# Patient Record
Sex: FEMALE | Race: WHITE | NOT HISPANIC OR LATINO | Employment: FULL TIME | URBAN - METROPOLITAN AREA
[De-identification: names, ages, dates, MRNs, and addresses within clinical notes are randomized per-mention and may not be internally consistent; named-entity substitution may affect disease eponyms.]

---

## 2023-04-19 ENCOUNTER — APPOINTMENT (OUTPATIENT)
Dept: PRIMARY CARE | Facility: CLINIC | Age: 43
End: 2023-04-19

## 2024-02-04 ENCOUNTER — HOSPITAL ENCOUNTER (OUTPATIENT)
Dept: RADIOLOGY | Facility: EXTERNAL LOCATION | Age: 44
Discharge: HOME | End: 2024-02-04
Payer: COMMERCIAL

## 2024-02-04 DIAGNOSIS — M79.644 PAIN IN FINGER OF RIGHT HAND: ICD-10-CM

## 2024-03-28 ENCOUNTER — HOSPITAL ENCOUNTER (OUTPATIENT)
Dept: RADIOLOGY | Facility: HOSPITAL | Age: 44
Discharge: HOME | End: 2024-03-28
Payer: COMMERCIAL

## 2024-03-28 DIAGNOSIS — R92.8 OTHER ABNORMAL AND INCONCLUSIVE FINDINGS ON DIAGNOSTIC IMAGING OF BREAST: ICD-10-CM

## 2024-03-28 PROCEDURE — 77061 BREAST TOMOSYNTHESIS UNI: CPT | Mod: RT

## 2024-03-28 PROCEDURE — 77065 DX MAMMO INCL CAD UNI: CPT | Mod: RIGHT SIDE | Performed by: RADIOLOGY

## 2024-03-28 PROCEDURE — 76982 USE 1ST TARGET LESION: CPT | Mod: RT

## 2024-03-28 PROCEDURE — 76642 ULTRASOUND BREAST LIMITED: CPT | Mod: RIGHT SIDE | Performed by: RADIOLOGY

## 2024-03-28 PROCEDURE — 77061 BREAST TOMOSYNTHESIS UNI: CPT | Mod: RIGHT SIDE | Performed by: RADIOLOGY

## 2024-03-28 PROCEDURE — 76642 ULTRASOUND BREAST LIMITED: CPT | Mod: RT

## 2024-06-21 ENCOUNTER — APPOINTMENT (OUTPATIENT)
Dept: PRIMARY CARE | Facility: CLINIC | Age: 44
End: 2024-06-21
Payer: COMMERCIAL

## 2024-06-21 ENCOUNTER — OFFICE VISIT (OUTPATIENT)
Dept: PRIMARY CARE | Facility: CLINIC | Age: 44
End: 2024-06-21
Payer: COMMERCIAL

## 2024-06-21 VITALS
TEMPERATURE: 96.6 F | HEIGHT: 60 IN | BODY MASS INDEX: 30.63 KG/M2 | RESPIRATION RATE: 14 BRPM | WEIGHT: 156 LBS | OXYGEN SATURATION: 100 % | DIASTOLIC BLOOD PRESSURE: 67 MMHG | HEART RATE: 64 BPM | SYSTOLIC BLOOD PRESSURE: 99 MMHG

## 2024-06-21 DIAGNOSIS — Z11.59 NEED FOR HEPATITIS C SCREENING TEST: ICD-10-CM

## 2024-06-21 DIAGNOSIS — Z12.11 SCREENING FOR COLON CANCER: Primary | ICD-10-CM

## 2024-06-21 DIAGNOSIS — Z11.3 SCREENING FOR STD (SEXUALLY TRANSMITTED DISEASE): ICD-10-CM

## 2024-06-21 DIAGNOSIS — Z00.00 ANNUAL PHYSICAL EXAM: ICD-10-CM

## 2024-06-21 DIAGNOSIS — J45.20 MILD INTERMITTENT ASTHMA WITHOUT COMPLICATION (HHS-HCC): ICD-10-CM

## 2024-06-21 PROCEDURE — 99213 OFFICE O/P EST LOW 20 MIN: CPT

## 2024-06-21 PROCEDURE — 99396 PREV VISIT EST AGE 40-64: CPT

## 2024-06-21 PROCEDURE — 1036F TOBACCO NON-USER: CPT

## 2024-06-21 RX ORDER — FLUTICASONE PROPIONATE 110 UG/1
AEROSOL, METERED RESPIRATORY (INHALATION)
COMMUNITY
Start: 2022-12-13

## 2024-06-21 RX ORDER — AZELASTINE 1 MG/ML
SPRAY, METERED NASAL
COMMUNITY
Start: 2021-10-20

## 2024-06-21 RX ORDER — CETIRIZINE HYDROCHLORIDE 10 MG/1
10 TABLET ORAL
COMMUNITY

## 2024-06-21 RX ORDER — OMEPRAZOLE 40 MG/1
CAPSULE, DELAYED RELEASE ORAL
COMMUNITY
End: 2024-06-21 | Stop reason: ALTCHOICE

## 2024-06-21 RX ORDER — ALBUTEROL SULFATE 0.83 MG/ML
SOLUTION RESPIRATORY (INHALATION)
COMMUNITY

## 2024-06-21 RX ORDER — FAMOTIDINE 20 MG/1
TABLET, FILM COATED ORAL
COMMUNITY
Start: 2022-11-28

## 2024-06-21 SDOH — ECONOMIC STABILITY: FOOD INSECURITY: WITHIN THE PAST 12 MONTHS, YOU WORRIED THAT YOUR FOOD WOULD RUN OUT BEFORE YOU GOT MONEY TO BUY MORE.: NEVER TRUE

## 2024-06-21 SDOH — ECONOMIC STABILITY: FOOD INSECURITY: WITHIN THE PAST 12 MONTHS, THE FOOD YOU BOUGHT JUST DIDN'T LAST AND YOU DIDN'T HAVE MONEY TO GET MORE.: NEVER TRUE

## 2024-06-21 ASSESSMENT — ANXIETY QUESTIONNAIRES
7. FEELING AFRAID AS IF SOMETHING AWFUL MIGHT HAPPEN: SEVERAL DAYS
4. TROUBLE RELAXING: NEARLY EVERY DAY
GAD7 TOTAL SCORE: 10
1. FEELING NERVOUS, ANXIOUS, OR ON EDGE: SEVERAL DAYS
IF YOU CHECKED OFF ANY PROBLEMS ON THIS QUESTIONNAIRE, HOW DIFFICULT HAVE THESE PROBLEMS MADE IT FOR YOU TO DO YOUR WORK, TAKE CARE OF THINGS AT HOME, OR GET ALONG WITH OTHER PEOPLE: SOMEWHAT DIFFICULT
6. BECOMING EASILY ANNOYED OR IRRITABLE: SEVERAL DAYS
3. WORRYING TOO MUCH ABOUT DIFFERENT THINGS: NEARLY EVERY DAY
2. NOT BEING ABLE TO STOP OR CONTROL WORRYING: NOT AT ALL
5. BEING SO RESTLESS THAT IT IS HARD TO SIT STILL: SEVERAL DAYS

## 2024-06-21 ASSESSMENT — LIFESTYLE VARIABLES
AUDIT-C TOTAL SCORE: 0
HOW OFTEN DO YOU HAVE A DRINK CONTAINING ALCOHOL: NEVER
HOW OFTEN DO YOU HAVE SIX OR MORE DRINKS ON ONE OCCASION: NEVER
SKIP TO QUESTIONS 9-10: 1
HOW MANY STANDARD DRINKS CONTAINING ALCOHOL DO YOU HAVE ON A TYPICAL DAY: PATIENT DOES NOT DRINK

## 2024-06-21 ASSESSMENT — ENCOUNTER SYMPTOMS
GASTROINTESTINAL NEGATIVE: 1
CONSTITUTIONAL NEGATIVE: 1
PSYCHIATRIC NEGATIVE: 1
MUSCULOSKELETAL NEGATIVE: 1
HEMATOLOGIC/LYMPHATIC NEGATIVE: 1
NEUROLOGICAL NEGATIVE: 1
CARDIOVASCULAR NEGATIVE: 1
RESPIRATORY NEGATIVE: 1
EYES NEGATIVE: 1
ALLERGIC/IMMUNOLOGIC NEGATIVE: 1
ENDOCRINE NEGATIVE: 1

## 2024-06-21 ASSESSMENT — PATIENT HEALTH QUESTIONNAIRE - PHQ9
2. FEELING DOWN, DEPRESSED OR HOPELESS: NOT AT ALL
SUM OF ALL RESPONSES TO PHQ9 QUESTIONS 1 & 2: 0
1. LITTLE INTEREST OR PLEASURE IN DOING THINGS: NOT AT ALL

## 2024-06-21 ASSESSMENT — PAIN SCALES - GENERAL: PAINLEVEL: 0-NO PAIN

## 2024-06-21 NOTE — PATIENT INSTRUCTIONS
Thank you for coming to see me today.  If you have any questions or concerns following our visit, please contact the office.  Phone: [phone]    Follow up with me in 1 year    1)  Get fasting labwork completed.  The lab is down the [place] from our office, I will reach out with results

## 2024-06-21 NOTE — PROGRESS NOTES
Subjective   Patient ID: Ann Marie Montanze is a 44 y.o. female who presents for Annual Exam (Needs order for colonoscopy ).    JANKI Jesus presents for a yearly annual exam. She has not seen a PCP in a couple years. She would like a colonoscopy- she has contacted gen surg to set up an appointment already.  She eats healthy and walks when the weather permits.    Review of Systems   Constitutional: Negative.    HENT: Negative.     Eyes: Negative.    Respiratory: Negative.     Cardiovascular: Negative.    Gastrointestinal: Negative.    Endocrine: Negative.    Genitourinary: Negative.    Musculoskeletal: Negative.    Skin: Negative.    Allergic/Immunologic: Negative.    Neurological: Negative.    Hematological: Negative.    Psychiatric/Behavioral: Negative.           Objective   BP 99/67 (BP Location: Right arm, Patient Position: Sitting)   Pulse 64   Temp 35.9 °C (96.6 °F) (Temporal)   Resp 14   Ht 1.524 m (5')   Wt 70.8 kg (156 lb)   SpO2 100%   BMI 30.47 kg/m²     Physical Exam  Constitutional:       Appearance: Normal appearance.   HENT:      Right Ear: Tympanic membrane normal.      Left Ear: Tympanic membrane normal.   Cardiovascular:      Rate and Rhythm: Normal rate and regular rhythm.      Pulses: Normal pulses.      Heart sounds: Normal heart sounds.   Pulmonary:      Effort: Pulmonary effort is normal.      Breath sounds: Normal breath sounds.   Abdominal:      General: Bowel sounds are normal.   Musculoskeletal:         General: Normal range of motion.   Skin:     General: Skin is warm and dry.      Capillary Refill: Capillary refill takes less than 2 seconds.   Neurological:      Mental Status: She is oriented to person, place, and time.   Psychiatric:         Mood and Affect: Mood normal.         Behavior: Behavior normal.         Thought Content: Thought content normal.         Judgment: Judgment normal.         Assessment/Plan   Problem List Items Addressed This Visit             ICD-10-CM     Screening for colon cancer - Primary Z12.11    Relevant Orders    Colonoscopy Screening; Average Risk Patient    Need for hepatitis C screening test Z11.59    Relevant Orders    Hepatitis C Antibody    Mild intermittent asthma without complication ([facility]) J45.20     Other Visit Diagnoses         Codes    Screening for STD (sexually transmitted disease)     Z11.3    Relevant Orders    HIV 1/2 Antigen/Antibody Screen with Reflex to Confirmation    Annual physical exam     Z00.00    Relevant Orders    CBC    Comprehensive Metabolic Panel    Lipid Panel    TSH with reflex to Free T4 if abnormal

## 2024-06-24 ENCOUNTER — LAB (OUTPATIENT)
Dept: LAB | Facility: LAB | Age: 44
End: 2024-06-24
Payer: COMMERCIAL

## 2024-06-24 DIAGNOSIS — Z11.59 NEED FOR HEPATITIS C SCREENING TEST: ICD-10-CM

## 2024-06-24 DIAGNOSIS — Z00.00 ANNUAL PHYSICAL EXAM: ICD-10-CM

## 2024-06-24 DIAGNOSIS — Z11.3 SCREENING FOR STD (SEXUALLY TRANSMITTED DISEASE): ICD-10-CM

## 2024-06-24 LAB
ALBUMIN SERPL BCP-MCNC: 4.2 G/DL (ref 3.4–5)
ALP SERPL-CCNC: 36 U/L (ref 33–110)
ALT SERPL W P-5'-P-CCNC: 28 U/L (ref 7–45)
ANION GAP SERPL CALC-SCNC: 9 MMOL/L (ref 10–20)
AST SERPL W P-5'-P-CCNC: 73 U/L (ref 9–39)
BILIRUB SERPL-MCNC: 1 MG/DL (ref 0–1.2)
BUN SERPL-MCNC: 13 MG/DL (ref 6–23)
CALCIUM SERPL-MCNC: 8.6 MG/DL (ref 8.6–10.3)
CHLORIDE SERPL-SCNC: 105 MMOL/L (ref 98–107)
CHOLEST SERPL-MCNC: 142 MG/DL (ref 0–199)
CHOLESTEROL/HDL RATIO: 2.4
CO2 SERPL-SCNC: 26 MMOL/L (ref 21–32)
CREAT SERPL-MCNC: 0.6 MG/DL (ref 0.5–1.05)
EGFRCR SERPLBLD CKD-EPI 2021: >90 ML/MIN/1.73M*2
ERYTHROCYTE [DISTWIDTH] IN BLOOD BY AUTOMATED COUNT: 12.1 % (ref 11.5–14.5)
GLUCOSE SERPL-MCNC: 79 MG/DL (ref 74–99)
HCT VFR BLD AUTO: 38.5 % (ref 36–46)
HCV AB SER QL: NONREACTIVE
HDLC SERPL-MCNC: 58.3 MG/DL
HGB BLD-MCNC: 12.7 G/DL (ref 12–16)
HIV 1+2 AB+HIV1 P24 AG SERPL QL IA: NONREACTIVE
LDLC SERPL CALC-MCNC: 75 MG/DL
MCH RBC QN AUTO: 28.3 PG (ref 26–34)
MCHC RBC AUTO-ENTMCNC: 33 G/DL (ref 32–36)
MCV RBC AUTO: 86 FL (ref 80–100)
NON HDL CHOLESTEROL: 84 MG/DL (ref 0–149)
NRBC BLD-RTO: 0 /100 WBCS (ref 0–0)
PLATELET # BLD AUTO: 280 X10*3/UL (ref 150–450)
POTASSIUM SERPL-SCNC: 4.2 MMOL/L (ref 3.5–5.3)
PROT SERPL-MCNC: 6 G/DL (ref 6.4–8.2)
RBC # BLD AUTO: 4.49 X10*6/UL (ref 4–5.2)
SODIUM SERPL-SCNC: 136 MMOL/L (ref 136–145)
TRIGL SERPL-MCNC: 45 MG/DL (ref 0–149)
TSH SERPL-ACNC: 2.54 MIU/L (ref 0.44–3.98)
VLDL: 9 MG/DL (ref 0–40)
WBC # BLD AUTO: 3.6 X10*3/UL (ref 4.4–11.3)

## 2024-06-24 PROCEDURE — 85027 COMPLETE CBC AUTOMATED: CPT

## 2024-06-24 PROCEDURE — 36415 COLL VENOUS BLD VENIPUNCTURE: CPT

## 2024-06-24 PROCEDURE — 86803 HEPATITIS C AB TEST: CPT

## 2024-06-24 PROCEDURE — 84443 ASSAY THYROID STIM HORMONE: CPT

## 2024-06-24 PROCEDURE — 80061 LIPID PANEL: CPT

## 2024-06-24 PROCEDURE — 80053 COMPREHEN METABOLIC PANEL: CPT

## 2024-06-24 PROCEDURE — 87389 HIV-1 AG W/HIV-1&-2 AB AG IA: CPT

## 2024-07-08 ENCOUNTER — LAB (OUTPATIENT)
Dept: LAB | Facility: LAB | Age: 44
End: 2024-07-08
Payer: COMMERCIAL

## 2024-07-08 DIAGNOSIS — R89.9 ABNORMAL LABORATORY TEST RESULT: ICD-10-CM

## 2024-07-08 LAB
ALBUMIN SERPL BCP-MCNC: 4.2 G/DL (ref 3.4–5)
ALP SERPL-CCNC: 31 U/L (ref 33–110)
ALT SERPL W P-5'-P-CCNC: 8 U/L (ref 7–45)
ANION GAP SERPL CALC-SCNC: 11 MMOL/L (ref 10–20)
AST SERPL W P-5'-P-CCNC: 11 U/L (ref 9–39)
BILIRUB SERPL-MCNC: 0.7 MG/DL (ref 0–1.2)
BUN SERPL-MCNC: 13 MG/DL (ref 6–23)
CALCIUM SERPL-MCNC: 9.1 MG/DL (ref 8.6–10.3)
CHLORIDE SERPL-SCNC: 109 MMOL/L (ref 98–107)
CO2 SERPL-SCNC: 24 MMOL/L (ref 21–32)
CREAT SERPL-MCNC: 0.71 MG/DL (ref 0.5–1.05)
EGFRCR SERPLBLD CKD-EPI 2021: >90 ML/MIN/1.73M*2
ERYTHROCYTE [DISTWIDTH] IN BLOOD BY AUTOMATED COUNT: 12.6 % (ref 11.5–14.5)
GLUCOSE SERPL-MCNC: 90 MG/DL (ref 74–99)
HCT VFR BLD AUTO: 39.8 % (ref 36–46)
HGB BLD-MCNC: 13.1 G/DL (ref 12–16)
MCH RBC QN AUTO: 27.9 PG (ref 26–34)
MCHC RBC AUTO-ENTMCNC: 32.9 G/DL (ref 32–36)
MCV RBC AUTO: 85 FL (ref 80–100)
NRBC BLD-RTO: 0 /100 WBCS (ref 0–0)
PLATELET # BLD AUTO: 266 X10*3/UL (ref 150–450)
POTASSIUM SERPL-SCNC: 4.6 MMOL/L (ref 3.5–5.3)
PROT SERPL-MCNC: 6 G/DL (ref 6.4–8.2)
RBC # BLD AUTO: 4.69 X10*6/UL (ref 4–5.2)
SODIUM SERPL-SCNC: 139 MMOL/L (ref 136–145)
WBC # BLD AUTO: 4 X10*3/UL (ref 4.4–11.3)

## 2024-07-08 PROCEDURE — 36415 COLL VENOUS BLD VENIPUNCTURE: CPT

## 2024-07-08 PROCEDURE — 80053 COMPREHEN METABOLIC PANEL: CPT

## 2024-07-08 PROCEDURE — 85027 COMPLETE CBC AUTOMATED: CPT

## 2024-07-11 ENCOUNTER — TELEPHONE (OUTPATIENT)
Dept: PRIMARY CARE | Facility: CLINIC | Age: 44
End: 2024-07-11
Payer: COMMERCIAL

## 2024-07-11 ENCOUNTER — PATIENT MESSAGE (OUTPATIENT)
Dept: PRIMARY CARE | Facility: CLINIC | Age: 44
End: 2024-07-11
Payer: COMMERCIAL

## 2024-07-11 DIAGNOSIS — K21.9 GASTROESOPHAGEAL REFLUX DISEASE, UNSPECIFIED WHETHER ESOPHAGITIS PRESENT: Primary | ICD-10-CM

## 2024-07-11 RX ORDER — FAMOTIDINE 20 MG/1
20 TABLET, FILM COATED ORAL NIGHTLY
Qty: 30 TABLET | Refills: 5 | Status: SHIPPED | OUTPATIENT
Start: 2024-07-11 | End: 2025-01-07

## 2024-07-11 NOTE — TELEPHONE ENCOUNTER
Called patient and notified of message. Patient expressed verbal understanding and had no questions at this time. Pt will send this soon.

## 2024-07-11 NOTE — TELEPHONE ENCOUNTER
I can send something else in for her burping, but can she send me a picture of her tongue through a [employer] message?

## 2024-07-11 NOTE — TELEPHONE ENCOUNTER
Pt stated she was rx'd pantoprazole 40mg on 7/1 for acid reflux by a teledoc through her insurance. Pt has a red tip of her tongue that is still inflamed and the rest of her tongue is white. Pt is also burping throughout the day.  Pt states this is rx not helping. Pt is unable to get into anyone in the office today and leaves for vacation tomorrow. Pt states that she wanted to see what she should do. [facility] in [place].

## 2024-07-29 PROBLEM — J34.89 SINUS DRAINAGE: Status: ACTIVE | Noted: 2024-07-29

## 2024-07-29 PROBLEM — R49.0 CHRONIC HOARSENESS: Status: ACTIVE | Noted: 2022-12-21

## 2024-07-29 PROBLEM — E66.811 CLASS 1 OBESITY: Status: ACTIVE | Noted: 2024-07-29

## 2024-07-29 PROBLEM — E66.9 CLASS 1 OBESITY: Status: ACTIVE | Noted: 2024-07-29

## 2024-07-29 PROBLEM — J45.909 ASTHMA (HHS-HCC): Status: ACTIVE | Noted: 2024-07-29

## 2024-07-29 PROBLEM — J37.0 CHRONIC LARYNGITIS: Status: ACTIVE | Noted: 2022-12-21

## 2024-07-29 PROBLEM — Z85.820 HISTORY OF MALIGNANT MELANOMA: Status: ACTIVE | Noted: 2024-07-29

## 2024-07-30 ENCOUNTER — APPOINTMENT (OUTPATIENT)
Dept: PRIMARY CARE | Facility: CLINIC | Age: 44
End: 2024-07-30
Payer: COMMERCIAL

## 2024-07-30 VITALS
TEMPERATURE: 96.9 F | HEART RATE: 73 BPM | HEIGHT: 60 IN | DIASTOLIC BLOOD PRESSURE: 80 MMHG | BODY MASS INDEX: 29.45 KG/M2 | WEIGHT: 150 LBS | OXYGEN SATURATION: 99 % | RESPIRATION RATE: 20 BRPM | SYSTOLIC BLOOD PRESSURE: 118 MMHG

## 2024-07-30 DIAGNOSIS — K21.9 GASTROESOPHAGEAL REFLUX DISEASE, UNSPECIFIED WHETHER ESOPHAGITIS PRESENT: Primary | ICD-10-CM

## 2024-07-30 PROCEDURE — 3008F BODY MASS INDEX DOCD: CPT

## 2024-07-30 PROCEDURE — 1036F TOBACCO NON-USER: CPT

## 2024-07-30 PROCEDURE — 99213 OFFICE O/P EST LOW 20 MIN: CPT

## 2024-07-30 RX ORDER — OMEPRAZOLE 20 MG/1
20 CAPSULE, DELAYED RELEASE ORAL
Qty: 60 CAPSULE | Refills: 5 | Status: SHIPPED | OUTPATIENT
Start: 2024-07-30 | End: 2025-01-26

## 2024-07-30 RX ORDER — OMEPRAZOLE 40 MG/1
40 CAPSULE, DELAYED RELEASE ORAL
COMMUNITY
End: 2024-07-30

## 2024-07-30 SDOH — ECONOMIC STABILITY: FOOD INSECURITY: WITHIN THE PAST 12 MONTHS, YOU WORRIED THAT YOUR FOOD WOULD RUN OUT BEFORE YOU GOT MONEY TO BUY MORE.: NEVER TRUE

## 2024-07-30 SDOH — ECONOMIC STABILITY: FOOD INSECURITY: WITHIN THE PAST 12 MONTHS, THE FOOD YOU BOUGHT JUST DIDN'T LAST AND YOU DIDN'T HAVE MONEY TO GET MORE.: NEVER TRUE

## 2024-07-30 ASSESSMENT — ENCOUNTER SYMPTOMS
DEPRESSION: 0
NEUROLOGICAL NEGATIVE: 1
OCCASIONAL FEELINGS OF UNSTEADINESS: 0
ENDOCRINE NEGATIVE: 1
MUSCULOSKELETAL NEGATIVE: 1
PSYCHIATRIC NEGATIVE: 1
ALLERGIC/IMMUNOLOGIC NEGATIVE: 1
EYES NEGATIVE: 1
ROS GI COMMENTS: ACID REFLUX
RESPIRATORY NEGATIVE: 1
LOSS OF SENSATION IN FEET: 0
CARDIOVASCULAR NEGATIVE: 1
CONSTITUTIONAL NEGATIVE: 1
HEMATOLOGIC/LYMPHATIC NEGATIVE: 1

## 2024-07-30 ASSESSMENT — LIFESTYLE VARIABLES
AUDIT-C TOTAL SCORE: 0
HOW OFTEN DO YOU HAVE A DRINK CONTAINING ALCOHOL: NEVER
SKIP TO QUESTIONS 9-10: 1
HOW MANY STANDARD DRINKS CONTAINING ALCOHOL DO YOU HAVE ON A TYPICAL DAY: PATIENT DOES NOT DRINK
HOW OFTEN DO YOU HAVE SIX OR MORE DRINKS ON ONE OCCASION: NEVER

## 2024-07-30 ASSESSMENT — PATIENT HEALTH QUESTIONNAIRE - PHQ9
2. FEELING DOWN, DEPRESSED OR HOPELESS: NOT AT ALL
1. LITTLE INTEREST OR PLEASURE IN DOING THINGS: NOT AT ALL
SUM OF ALL RESPONSES TO PHQ9 QUESTIONS 1 & 2: 0

## 2024-07-30 ASSESSMENT — PAIN SCALES - GENERAL: PAINLEVEL: 0-NO PAIN

## 2024-07-30 NOTE — PROGRESS NOTES
Subjective   Patient ID: Ann Marie Montanez is a 44 y.o. female who presents for GERD (Acid reflux - tried taking pantoprazole didn't work.  Then was told to start taking omeprazole and Pepcid.  But is still having burning and burping.  Doesn't matter what is eaten.  -3 weeks.  //).    HPI   Ann Marie presents for a follow up on acid reflux. She endorses that she realized when her symptoms had started, she was told by ortho to start taking an aleve BID and she had eaten some acidic foods and then her acid reflux worsened. She does not get the typical burning sensation in her chest; she states that she can taste acid when she burps, or she wakes up and her throat is sore and tastes acidic. She is agreeable to meeting with GI to discuss an endoscopy  H pylori breath test ordered today.  Trying regimen of omeprazole 20 mg BID with famotidine during the day.    Review of Systems   Constitutional: Negative.    HENT: Negative.     Eyes: Negative.    Respiratory: Negative.     Cardiovascular: Negative.    Gastrointestinal:         Acid reflux   Endocrine: Negative.    Genitourinary: Negative.    Musculoskeletal: Negative.    Skin: Negative.    Allergic/Immunologic: Negative.    Neurological: Negative.    Hematological: Negative.    Psychiatric/Behavioral: Negative.         Objective   /80 (BP Location: Right arm, Patient Position: Sitting, BP Cuff Size: Adult)   Pulse 73   Temp 36.1 °C (96.9 °F)   Resp 20   Ht 1.524 m (5')   Wt 68 kg (150 lb)   SpO2 99%   BMI 29.29 kg/m²     Physical Exam  Cardiovascular:      Rate and Rhythm: Normal rate and regular rhythm.      Pulses: Normal pulses.      Heart sounds: Normal heart sounds.   Pulmonary:      Effort: Pulmonary effort is normal.      Breath sounds: Normal breath sounds.   Musculoskeletal:         General: Normal range of motion.   Skin:     General: Skin is warm and dry.      Capillary Refill: Capillary refill takes less than 2 seconds.   Neurological:      Mental  Status: She is oriented to person, place, and time.   Psychiatric:         Mood and Affect: Mood normal.         Behavior: Behavior normal.         Thought Content: Thought content normal.         Judgment: Judgment normal.         Assessment/Plan   Problem List Items Addressed This Visit             ICD-10-CM    GERD (gastroesophageal reflux disease) - Primary K21.9    Relevant Medications    omeprazole (PriLOSEC) 20 mg DR capsule    Other Relevant Orders    Referral to Gastroenterology    H. Pylori Breath Test

## 2024-07-30 NOTE — PATIENT INSTRUCTIONS
Thank you for coming to see me today.  If you have any questions or concerns following our visit, please contact the office.  Phone: [phone]    Follow up with me as scheduled    1)  Take omeprazole in the morning 30-60 minutes before eating and take it 60-90 minutes after dinner in the evening    2)

## 2024-07-31 ENCOUNTER — LAB (OUTPATIENT)
Dept: LAB | Facility: LAB | Age: 44
End: 2024-07-31
Payer: COMMERCIAL

## 2024-07-31 DIAGNOSIS — K21.9 GASTROESOPHAGEAL REFLUX DISEASE, UNSPECIFIED WHETHER ESOPHAGITIS PRESENT: ICD-10-CM

## 2024-07-31 PROCEDURE — 83013 H PYLORI (C-13) BREATH: CPT

## 2024-08-01 LAB — UREA BREATH TEST QL: NEGATIVE

## 2024-08-30 ENCOUNTER — TELEPHONE (OUTPATIENT)
Dept: GASTROENTEROLOGY | Facility: CLINIC | Age: 44
End: 2024-08-30
Payer: COMMERCIAL

## 2024-09-04 ENCOUNTER — TELEPHONE (OUTPATIENT)
Dept: GASTROENTEROLOGY | Facility: CLINIC | Age: 44
End: 2024-09-04
Payer: COMMERCIAL

## 2024-09-04 NOTE — TELEPHONE ENCOUNTER
----- Message from Cedrick WHITTEN sent at 9/4/2024  2:44 PM EDT -----  Regarding: RE: Schedule NP OV in Oct.  Called patient to schedule. She will call back if she decides to schedule here  ----- Message -----  From: Cedrick Coelho  Sent: 8/30/2024   2:04 PM EDT  To: [id number]  Subject: Schedule NP OV in Oct.                           Received faxed referral from Dr. Hu for GERD

## 2024-09-23 ENCOUNTER — HOSPITAL ENCOUNTER (OUTPATIENT)
Dept: GASTROENTEROLOGY | Facility: HOSPITAL | Age: 44
Discharge: HOME | End: 2024-09-23
Payer: COMMERCIAL

## 2024-09-23 ENCOUNTER — ANESTHESIA EVENT (OUTPATIENT)
Dept: GASTROENTEROLOGY | Facility: HOSPITAL | Age: 44
End: 2024-09-23
Payer: COMMERCIAL

## 2024-09-23 ENCOUNTER — ANESTHESIA (OUTPATIENT)
Dept: GASTROENTEROLOGY | Facility: HOSPITAL | Age: 44
End: 2024-09-23
Payer: COMMERCIAL

## 2024-09-23 VITALS
DIASTOLIC BLOOD PRESSURE: 69 MMHG | HEART RATE: 79 BPM | WEIGHT: 150 LBS | BODY MASS INDEX: 26.58 KG/M2 | SYSTOLIC BLOOD PRESSURE: 102 MMHG | RESPIRATION RATE: 20 BRPM | TEMPERATURE: 98.1 F | OXYGEN SATURATION: 100 % | HEIGHT: 63 IN

## 2024-09-23 DIAGNOSIS — Z12.11 SCREENING FOR COLON CANCER: ICD-10-CM

## 2024-09-23 DIAGNOSIS — Z80.0 FAMILY HISTORY OF COLON CANCER: ICD-10-CM

## 2024-09-23 PROCEDURE — 2500000004 HC RX 250 GENERAL PHARMACY W/ HCPCS (ALT 636 FOR OP/ED): Performed by: SURGERY

## 2024-09-23 PROCEDURE — 7100000009 HC PHASE TWO TIME - INITIAL BASE CHARGE

## 2024-09-23 PROCEDURE — 7100000010 HC PHASE TWO TIME - EACH INCREMENTAL 1 MINUTE

## 2024-09-23 PROCEDURE — 45378 DIAGNOSTIC COLONOSCOPY: CPT | Performed by: SURGERY

## 2024-09-23 PROCEDURE — 2500000004 HC RX 250 GENERAL PHARMACY W/ HCPCS (ALT 636 FOR OP/ED): Performed by: NURSE ANESTHETIST, CERTIFIED REGISTERED

## 2024-09-23 PROCEDURE — 3700000001 HC GENERAL ANESTHESIA TIME - INITIAL BASE CHARGE

## 2024-09-23 PROCEDURE — 2500000005 HC RX 250 GENERAL PHARMACY W/O HCPCS: Performed by: NURSE ANESTHETIST, CERTIFIED REGISTERED

## 2024-09-23 PROCEDURE — 3700000002 HC GENERAL ANESTHESIA TIME - EACH INCREMENTAL 1 MINUTE

## 2024-09-23 PROCEDURE — G0105 COLORECTAL SCRN; HI RISK IND: HCPCS | Performed by: SURGERY

## 2024-09-23 RX ORDER — PROPOFOL 10 MG/ML
INJECTION, EMULSION INTRAVENOUS AS NEEDED
Status: DISCONTINUED | OUTPATIENT
Start: 2024-09-23 | End: 2024-09-23

## 2024-09-23 RX ORDER — BISMUTH SUBSALICYLATE 262 MG
1 TABLET,CHEWABLE ORAL DAILY
COMMUNITY

## 2024-09-23 RX ORDER — SODIUM CHLORIDE 9 MG/ML
20 INJECTION, SOLUTION INTRAVENOUS CONTINUOUS
Status: DISCONTINUED | OUTPATIENT
Start: 2024-09-23 | End: 2024-09-24 | Stop reason: HOSPADM

## 2024-09-23 RX ORDER — LIDOCAINE HCL/PF 100 MG/5ML
SYRINGE (ML) INTRAVENOUS AS NEEDED
Status: DISCONTINUED | OUTPATIENT
Start: 2024-09-23 | End: 2024-09-23

## 2024-09-23 SDOH — HEALTH STABILITY: MENTAL HEALTH: CURRENT SMOKER: 0

## 2024-09-23 ASSESSMENT — PAIN SCALES - GENERAL
PAINLEVEL_OUTOF10: 0 - NO PAIN

## 2024-09-23 ASSESSMENT — COLUMBIA-SUICIDE SEVERITY RATING SCALE - C-SSRS
6. HAVE YOU EVER DONE ANYTHING, STARTED TO DO ANYTHING, OR PREPARED TO DO ANYTHING TO END YOUR LIFE?: NO
1. IN THE PAST MONTH, HAVE YOU WISHED YOU WERE DEAD OR WISHED YOU COULD GO TO SLEEP AND NOT WAKE UP?: NO
2. HAVE YOU ACTUALLY HAD ANY THOUGHTS OF KILLING YOURSELF?: NO

## 2024-09-23 ASSESSMENT — PAIN - FUNCTIONAL ASSESSMENT: PAIN_FUNCTIONAL_ASSESSMENT: 0-10

## 2024-09-23 NOTE — ANESTHESIA PREPROCEDURE EVALUATION
Patient: Ann Marie Montanez    Procedure Information       Date/Time: 09/23/24 1100    Scheduled providers: Delmar Mensah MD    Procedure: COLONOSCOPY    Location: [facility]            Relevant Problems   Pulmonary   (+) Asthma   (+) Mild intermittent asthma without complication ([facility]-HCC)      GI   (+) GERD (gastroesophageal reflux disease)      HEENT   (+) Sinus drainage       Clinical information reviewed:   Tobacco  Allergies  Meds   Med Hx  Surg Hx  OB Status  Fam Hx  Soc   Hx        NPO Detail:  NPO/Void Status  Date of Last Liquid: 09/23/24  Time of Last Liquid: 0200  Date of Last Solid: 09/21/24  Last Intake Type: Clear fluids         Physical Exam    Airway  Mallampati: II  TM distance: <3 FB     Cardiovascular - normal exam     Dental    Pulmonary - normal exam     Abdominal            Anesthesia Plan    History of general anesthesia?: no  History of complications of general anesthesia?: no    ASA 2     MAC     The patient is not a current smoker.    Anesthetic plan and risks discussed with patient.  Use of blood products discussed with who consented to blood products.

## 2024-09-23 NOTE — H&P
"History Of Present Illness  Ann Marie Montanez is a 44 y.o. female presenting with screen, occ brbpr.     Past Medical History  She has a past medical history of GERD (gastroesophageal reflux disease) and Personal history of malignant melanoma of skin.    Surgical History  She has a past surgical history that includes Other surgical history (09/16/2021).     Social History  She reports that she has never smoked. She has never been exposed to tobacco smoke. She has never used smokeless tobacco. She reports current alcohol use. She reports that she does not use drugs.    Family History  Family History   Problem Relation Name Age of Onset    Colon cancer Maternal Grandmother          Allergies  Cefaclor and Pollen extracts    Review of Systems   All other systems reviewed and are negative.       Physical Exam  Vitals reviewed.   Cardiovascular:      Rate and Rhythm: Normal rate and regular rhythm.   Pulmonary:      Breath sounds: Normal breath sounds.   Skin:     General: Skin is warm and dry.   Neurological:      Mental Status: She is alert.   Psychiatric:         Mood and Affect: Mood normal.          Last Recorded Vitals  Blood pressure 116/82, pulse 85, temperature 36.3 °C (97.4 °F), temperature source Temporal, resp. rate 16, height 1.6 m (5' 3\"), weight 68 kg (150 lb), SpO2 100%.    Relevant Results               Assessment/Plan   Assessment & Plan  Screening for colon cancer    Family history of colon cancer      For colo       I spent 15 minutes in the professional and overall care of this patient.      Delmar Mensah MD  "

## 2024-09-23 NOTE — ANESTHESIA POSTPROCEDURE EVALUATION
Patient: Ann Marie Montanez    Procedure Summary       Date: 09/23/24 Room / Location: [facility]    Anesthesia Start: 1040 Anesthesia Stop: 1120    Procedure: COLONOSCOPY Diagnosis:       Screening for colon cancer      Family history of colon cancer    Scheduled Providers: Delmar Mensah MD Responsible Provider: TYLER Riggins    Anesthesia Type: MAC ASA Status: 2            Anesthesia Type: MAC    Vitals Value Taken Time   BP 94/60 09/23/24 1116   Temp 36.4 °C (97.5 °F) 09/23/24 1116   Pulse 81 09/23/24 1116   Resp 16 09/23/24 1116   SpO2 100 % 09/23/24 1116       Anesthesia Post Evaluation    Patient location during evaluation: bedside  Patient participation: complete - patient participated  Level of consciousness: awake  Pain management: adequate  Airway patency: patent  Cardiovascular status: acceptable  Respiratory status: acceptable  Hydration status: acceptable  Postoperative Nausea and Vomiting: none    There were no known notable events for this encounter.

## 2024-09-24 ENCOUNTER — HOSPITAL ENCOUNTER (OUTPATIENT)
Dept: RADIOLOGY | Facility: CLINIC | Age: 44
Discharge: HOME | End: 2024-09-24
Payer: COMMERCIAL

## 2024-09-24 ENCOUNTER — APPOINTMENT (OUTPATIENT)
Dept: PRIMARY CARE | Facility: CLINIC | Age: 44
End: 2024-09-24
Payer: COMMERCIAL

## 2024-09-24 VITALS
DIASTOLIC BLOOD PRESSURE: 70 MMHG | WEIGHT: 151.8 LBS | HEART RATE: 73 BPM | RESPIRATION RATE: 20 BRPM | HEIGHT: 63 IN | TEMPERATURE: 96.9 F | SYSTOLIC BLOOD PRESSURE: 118 MMHG | OXYGEN SATURATION: 99 % | BODY MASS INDEX: 26.9 KG/M2

## 2024-09-24 VITALS — HEIGHT: 63 IN | BODY MASS INDEX: 26.58 KG/M2 | WEIGHT: 150 LBS

## 2024-09-24 DIAGNOSIS — R21 RASH OF BOTH HANDS: Primary | ICD-10-CM

## 2024-09-24 DIAGNOSIS — Z12.31 SCREENING MAMMOGRAM FOR BREAST CANCER: ICD-10-CM

## 2024-09-24 DIAGNOSIS — J45.20 MILD INTERMITTENT ASTHMA WITHOUT COMPLICATION (HHS-HCC): Primary | ICD-10-CM

## 2024-09-24 DIAGNOSIS — J30.9 ALLERGIC RHINITIS, UNSPECIFIED SEASONALITY, UNSPECIFIED TRIGGER: ICD-10-CM

## 2024-09-24 PROCEDURE — 77063 BREAST TOMOSYNTHESIS BI: CPT | Performed by: RADIOLOGY

## 2024-09-24 PROCEDURE — 77067 SCR MAMMO BI INCL CAD: CPT

## 2024-09-24 PROCEDURE — 3008F BODY MASS INDEX DOCD: CPT

## 2024-09-24 PROCEDURE — 77067 SCR MAMMO BI INCL CAD: CPT | Performed by: RADIOLOGY

## 2024-09-24 PROCEDURE — 1036F TOBACCO NON-USER: CPT

## 2024-09-24 PROCEDURE — 99213 OFFICE O/P EST LOW 20 MIN: CPT

## 2024-09-24 RX ORDER — OMEPRAZOLE 40 MG/1
1 CAPSULE, DELAYED RELEASE ORAL
COMMUNITY
Start: 2024-09-03

## 2024-09-24 RX ORDER — ALBUTEROL SULFATE 90 UG/1
2 INHALANT RESPIRATORY (INHALATION) EVERY 4 HOURS PRN
Qty: 8.5 G | Refills: 3 | Status: SHIPPED | OUTPATIENT
Start: 2024-09-24 | End: 2025-09-24

## 2024-09-24 RX ORDER — ALBUTEROL SULFATE 0.83 MG/ML
2.5 SOLUTION RESPIRATORY (INHALATION) EVERY 4 HOURS PRN
Qty: 75 ML | Refills: 1 | Status: SHIPPED | OUTPATIENT
Start: 2024-09-24 | End: 2024-09-24 | Stop reason: WASHOUT

## 2024-09-24 RX ORDER — FLUTICASONE PROPIONATE 110 UG/1
1 AEROSOL, METERED RESPIRATORY (INHALATION)
Qty: 12 G | Refills: 11 | Status: SHIPPED | OUTPATIENT
Start: 2024-09-24 | End: 2024-09-24 | Stop reason: WASHOUT

## 2024-09-24 SDOH — ECONOMIC STABILITY: FOOD INSECURITY: WITHIN THE PAST 12 MONTHS, THE FOOD YOU BOUGHT JUST DIDN'T LAST AND YOU DIDN'T HAVE MONEY TO GET MORE.: NEVER TRUE

## 2024-09-24 SDOH — ECONOMIC STABILITY: FOOD INSECURITY: WITHIN THE PAST 12 MONTHS, YOU WORRIED THAT YOUR FOOD WOULD RUN OUT BEFORE YOU GOT MONEY TO BUY MORE.: NEVER TRUE

## 2024-09-24 ASSESSMENT — PATIENT HEALTH QUESTIONNAIRE - PHQ9
SUM OF ALL RESPONSES TO PHQ9 QUESTIONS 1 & 2: 0
1. LITTLE INTEREST OR PLEASURE IN DOING THINGS: NOT AT ALL
2. FEELING DOWN, DEPRESSED OR HOPELESS: NOT AT ALL

## 2024-09-24 ASSESSMENT — LIFESTYLE VARIABLES
HOW OFTEN DO YOU HAVE SIX OR MORE DRINKS ON ONE OCCASION: NEVER
HOW MANY STANDARD DRINKS CONTAINING ALCOHOL DO YOU HAVE ON A TYPICAL DAY: PATIENT DOES NOT DRINK
SKIP TO QUESTIONS 9-10: 1
AUDIT-C TOTAL SCORE: 0
HOW OFTEN DO YOU HAVE A DRINK CONTAINING ALCOHOL: NEVER

## 2024-09-24 ASSESSMENT — ENCOUNTER SYMPTOMS
DEPRESSION: 0
MUSCULOSKELETAL NEGATIVE: 1
LOSS OF SENSATION IN FEET: 0
EYES NEGATIVE: 1
OCCASIONAL FEELINGS OF UNSTEADINESS: 0
CARDIOVASCULAR NEGATIVE: 1
RESPIRATORY NEGATIVE: 1
CONSTITUTIONAL NEGATIVE: 1
GASTROINTESTINAL NEGATIVE: 1
HEMATOLOGIC/LYMPHATIC NEGATIVE: 1

## 2024-09-24 ASSESSMENT — PAIN SCALES - GENERAL: PAINLEVEL: 0-NO PAIN

## 2024-09-24 NOTE — PATIENT INSTRUCTIONS
Thank you for coming to see me today.  If you have any questions or concerns following our visit, please contact the office.  Phone: [phone]    Follow up with me as scheduled

## 2024-09-24 NOTE — ASSESSMENT & PLAN NOTE
Has improved since last visit- is not present anywhere else on body. Throat is erythematous and she is also experiencing rhinorrhea with postnasal drip. Denies fevers, chills, body aches

## 2024-09-24 NOTE — PROGRESS NOTES
"Subjective   Patient ID: Ann Marie Montanez is a 44 y.o. female who presents for wart (Bilateral hands right hand 1st digit left 3rd digit.).    HPI   Ann Marie presents with concerns for warts on her bilateral hands. She endorses that there have been a couple red spots that have shown up, but have disappeared since. She also has a calloused area on her right thumb that she states has improved since she called to make the appointment. 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 Suspect this may be viral rather than warts, since the areas have improved. Education provided to call office for follow up if these areas worsen.   She also has been having increased sinus drainage and states this happens with a change in weather. Flovent inhaler and albuterol nebulizer solution sent to pharmacy.     Review of Systems   Constitutional: Negative.    HENT:  Positive for postnasal drip.    Eyes: Negative.    Respiratory: Negative.     Cardiovascular: Negative.    Gastrointestinal: Negative.    Genitourinary: Negative.    Musculoskeletal: Negative.    Skin:  Positive for rash.   Hematological: Negative.        Objective   /70 (BP Location: Right arm, Patient Position: Sitting, BP Cuff Size: Adult)   Pulse 73   Temp 36.1 °C (96.9 °F)   Resp 20   Ht 1.6 m (5' 3\")   Wt 68.9 kg (151 lb 12.8 oz)   SpO2 99%   BMI 26.89 kg/m²     Physical Exam  HENT:      Right Ear: Tympanic membrane normal.      Left Ear: Tympanic membrane normal.      Nose: Congestion and rhinorrhea present.      Mouth/Throat:      Pharynx: Posterior oropharyngeal erythema present.   Skin:     General: Skin is warm and dry.      Findings: Rash present. Rash is papular.      Comments: Sporadically on hands- 4-5 spots noted that have improved per patient         Assessment/Plan   Problem List Items Addressed This Visit             ICD-10-CM    Allergic rhinitis J30.9    " Relevant Medications    albuterol 2.5 mg /3 mL (0.083 %) nebulizer solution    fluticasone (Flovent HFA) 110 mcg/actuation inhaler    Rash of both hands - Primary R21     Has improved since last visit- is not present anywhere else on body. Throat is erythematous and she is also experiencing rhinorrhea with postnasal drip. Denies fevers, chills, body aches

## 2024-10-08 DIAGNOSIS — R92.8 ABNORMAL MAMMOGRAM OF LEFT BREAST: Primary | ICD-10-CM

## 2024-10-24 ENCOUNTER — HOSPITAL ENCOUNTER (OUTPATIENT)
Dept: RADIOLOGY | Facility: HOSPITAL | Age: 44
Discharge: HOME | End: 2024-10-24
Payer: COMMERCIAL

## 2024-10-24 DIAGNOSIS — R92.8 ABNORMAL MAMMOGRAM OF LEFT BREAST: ICD-10-CM

## 2024-10-24 PROCEDURE — 77065 DX MAMMO INCL CAD UNI: CPT | Mod: LT

## 2024-10-24 PROCEDURE — 77065 DX MAMMO INCL CAD UNI: CPT | Mod: LEFT SIDE | Performed by: RADIOLOGY

## 2024-10-25 DIAGNOSIS — R92.8 ABNORMAL MAMMOGRAM OF LEFT BREAST: Primary | ICD-10-CM

## 2024-10-31 ENCOUNTER — APPOINTMENT (OUTPATIENT)
Dept: GASTROENTEROLOGY | Facility: CLINIC | Age: 44
End: 2024-10-31
Payer: COMMERCIAL

## 2024-10-31 VITALS
WEIGHT: 158 LBS | HEART RATE: 80 BPM | BODY MASS INDEX: 28 KG/M2 | DIASTOLIC BLOOD PRESSURE: 64 MMHG | HEIGHT: 63 IN | SYSTOLIC BLOOD PRESSURE: 102 MMHG

## 2024-10-31 DIAGNOSIS — K21.9 GASTROESOPHAGEAL REFLUX DISEASE, UNSPECIFIED WHETHER ESOPHAGITIS PRESENT: ICD-10-CM

## 2024-10-31 PROCEDURE — 99204 OFFICE O/P NEW MOD 45 MIN: CPT | Performed by: NURSE PRACTITIONER

## 2024-10-31 PROCEDURE — 3008F BODY MASS INDEX DOCD: CPT | Performed by: NURSE PRACTITIONER

## 2024-10-31 PROCEDURE — 1036F TOBACCO NON-USER: CPT | Performed by: NURSE PRACTITIONER

## 2024-10-31 ASSESSMENT — ENCOUNTER SYMPTOMS
WEAKNESS: 0
SHORTNESS OF BREATH: 0
PALPITATIONS: 0
DIZZINESS: 0
ROS GI COMMENTS: SEE HPI
DIFFICULTY URINATING: 0
CONFUSION: 0
TROUBLE SWALLOWING: 0
SORE THROAT: 0
FEVER: 0
CHILLS: 0
ADENOPATHY: 0
WOUND: 0
JOINT SWELLING: 0
COUGH: 0
BRUISES/BLEEDS EASILY: 0
ARTHRALGIAS: 0

## 2024-10-31 NOTE — H&P (VIEW-ONLY)
"Subjective   Patient ID: Ann Marie Montanez is a 44 y.o. female with PMH of GERD and melanoma who was referred by Claribel Whiteside* for New Patient Visit (GERD - capsule done 2/2015/Colon done 9/23/24 with Dr. Mensah).     Patient's PCP is Claribel Whiteside, MIRA-CNP     HPI  Patient saw PCP in July with complaints of acid reflux. H. Pylori test was ordered and negative. She saw ENT (Dr. Hu) in August who assessed that she had chronic laryngitis due to GERD. He increased her PPI and recommended she see GI for an EGD.     She reports having burning, belching, and reflux all Summer. She states her \"lips and tongue were burning\", as well as her chest. She was eating a lot of salads with acidic fruits and vinegarette dressings, and didn't realize how much acid she was consuming. She did lose a significant amount of weight with diet and exercise. She is now avoiding acidic and fried foods, and symptoms have improved overall now. She does still frequently have throat clearing and has some burning. She has a hoarse voice at times that ENT feels is caused by reflux.     She otherwise does not report any dysphagia, melena, abdominal pain, change in stools, or hematochezia.       Summary of endoscopies:  - BRAVO capsule 2/2015: Demeester score 14.72; done off PPI x7 days ([facility])  - Colonoscopy 9/2024: normal colon     Social Hx:  Tobacco: none   Etoh: 1/week  Recreational drug use: none   NSAIDs: none      Family Hx:  No GI malignancy, IBD, or pancreatitis     Review of Systems:  Review of Systems   Constitutional:  Negative for chills and fever.   HENT:  Negative for sore throat and trouble swallowing.    Respiratory:  Negative for cough and shortness of breath.    Cardiovascular:  Negative for chest pain and palpitations.   Gastrointestinal:         SEE HPI   Endocrine: Negative for cold intolerance and heat intolerance.   Genitourinary:  Negative for difficulty urinating.   Musculoskeletal:  Negative for " arthralgias and joint swelling.   Skin:  Negative for rash and wound.   Neurological:  Negative for dizziness and weakness.   Hematological:  Negative for adenopathy. Does not bruise/bleed easily.   Psychiatric/Behavioral:  Negative for confusion.         Medications:  Prior to Admission medications    Medication Sig Start Date End Date Taking? Authorizing Provider   albuterol (ProAir HFA) 90 mcg/actuation inhaler Inhale 2 puffs every 4 hours if needed for wheezing or shortness of breath. 9/24/24 9/24/25  WILBERTO Corea   beclomethasone HFA (Qvar) 40 mcg/actuation inhaler Inhale 1 Inhalation 2 times a day. Rinse mouth with water after use to reduce aftertaste and incidence of candidiasis. Do not swallow. 9/24/24 1/22/25  WILBERTO Corea   cetirizine (ZyrTEC) 10 mg tablet Take 1 tablet (10 mg) by mouth.    Historical Provider, MD   famotidine (Pepcid) 20 mg tablet Take 1 tablet (20 mg) by mouth once daily at bedtime. 7/11/24 1/7/25  WILBERTO Corea   multivitamin tablet Take 1 tablet by mouth once daily.    Historical Provider, MD   omeprazole (PriLOSEC) 40 mg DR capsule Take 1 capsule (40 mg) by mouth early in the morning.. 9/3/24   Historical Provider, MD       Allergies:  Cefaclor and Pollen extracts    Past Medical History:  She has a past medical history of GERD (gastroesophageal reflux disease) and Personal history of malignant melanoma of skin.    Past Surgical History:  She has a past surgical history that includes Other surgical history (09/16/2021) and Colonoscopy (09/23/2024).    Social History:  She reports that she has never smoked. She has never been exposed to tobacco smoke. She has never used smokeless tobacco. She reports current alcohol use of about 1.0 standard drink of alcohol per week. She reports that she does not use drugs.    Objective   Physical exam:  Physical Exam  Constitutional:       General: She is not in acute distress.      Appearance: Normal appearance.   HENT:      Mouth/Throat:      Mouth: Mucous membranes are moist.      Comments: pink  Eyes:      Conjunctiva/sclera: Conjunctivae normal.      Pupils: Pupils are equal, round, and reactive to light.   Cardiovascular:      Rate and Rhythm: Normal rate and regular rhythm.      Heart sounds: No murmur heard.  Pulmonary:      Effort: Pulmonary effort is normal.      Breath sounds: Normal breath sounds.   Abdominal:      General: Bowel sounds are normal. There is no distension.      Palpations: Abdomen is soft.      Tenderness: There is no abdominal tenderness. There is no guarding.   Skin:     General: Skin is warm and dry.      Coloration: Skin is not jaundiced.   Neurological:      Mental Status: She is alert and oriented to person, place, and time.   Psychiatric:         Mood and Affect: Mood normal.         Behavior: Behavior normal.          Assessment/Plan     GERD  Patient with burning and some atypical reflux symptoms as well such as hoarse voice and chronic throat clearing. Symptoms overall improved with PPI, H2 blocker, and lifestyle modifications, but still having some persistent symptoms. Saw ENT who saw reflux laryngitis on a nasal endoscopy and recommended EGD with GI. Discussed EGD today and role in GERD to r/o complications such as esophagitis. Pt agrees with procedure; will schedule in endo.     No meds to hold        Zee Crowder, MIRA-CNP

## 2024-11-25 ENCOUNTER — PREP FOR PROCEDURE (OUTPATIENT)
Dept: GASTROENTEROLOGY | Facility: CLINIC | Age: 44
End: 2024-11-25
Payer: COMMERCIAL

## 2024-11-27 ENCOUNTER — ANESTHESIA (OUTPATIENT)
Dept: GASTROENTEROLOGY | Facility: HOSPITAL | Age: 44
End: 2024-11-27
Payer: COMMERCIAL

## 2024-11-27 ENCOUNTER — ANESTHESIA EVENT (OUTPATIENT)
Dept: GASTROENTEROLOGY | Facility: HOSPITAL | Age: 44
End: 2024-11-27
Payer: COMMERCIAL

## 2024-11-27 ENCOUNTER — HOSPITAL ENCOUNTER (OUTPATIENT)
Dept: GASTROENTEROLOGY | Facility: HOSPITAL | Age: 44
Discharge: HOME | End: 2024-11-27
Payer: COMMERCIAL

## 2024-11-27 VITALS
HEART RATE: 64 BPM | OXYGEN SATURATION: 100 % | HEIGHT: 63 IN | BODY MASS INDEX: 28.35 KG/M2 | DIASTOLIC BLOOD PRESSURE: 70 MMHG | WEIGHT: 160 LBS | SYSTOLIC BLOOD PRESSURE: 116 MMHG | TEMPERATURE: 97 F | RESPIRATION RATE: 16 BRPM

## 2024-11-27 DIAGNOSIS — K21.9 GASTROESOPHAGEAL REFLUX DISEASE, UNSPECIFIED WHETHER ESOPHAGITIS PRESENT: ICD-10-CM

## 2024-11-27 PROCEDURE — 3700000002 HC GENERAL ANESTHESIA TIME - EACH INCREMENTAL 1 MINUTE

## 2024-11-27 PROCEDURE — 7100000010 HC PHASE TWO TIME - EACH INCREMENTAL 1 MINUTE

## 2024-11-27 PROCEDURE — 43235 EGD DIAGNOSTIC BRUSH WASH: CPT | Performed by: INTERNAL MEDICINE

## 2024-11-27 PROCEDURE — 7100000009 HC PHASE TWO TIME - INITIAL BASE CHARGE

## 2024-11-27 PROCEDURE — 3700000001 HC GENERAL ANESTHESIA TIME - INITIAL BASE CHARGE

## 2024-11-27 PROCEDURE — 2500000004 HC RX 250 GENERAL PHARMACY W/ HCPCS (ALT 636 FOR OP/ED): Performed by: NURSE ANESTHETIST, CERTIFIED REGISTERED

## 2024-11-27 RX ORDER — FENTANYL CITRATE 50 UG/ML
INJECTION, SOLUTION INTRAMUSCULAR; INTRAVENOUS AS NEEDED
Status: DISCONTINUED | OUTPATIENT
Start: 2024-11-27 | End: 2024-11-27

## 2024-11-27 RX ORDER — CHOLECALCIFEROL (VITAMIN D3) 25 MCG
1000 TABLET ORAL DAILY
COMMUNITY

## 2024-11-27 RX ORDER — SODIUM CHLORIDE 0.9 % (FLUSH) 0.9 %
SYRINGE (ML) INJECTION AS NEEDED
Status: DISCONTINUED | OUTPATIENT
Start: 2024-11-27 | End: 2024-11-27

## 2024-11-27 RX ORDER — ASCORBIC ACID 250 MG
500 TABLET ORAL DAILY
COMMUNITY

## 2024-11-27 RX ORDER — PROPOFOL 10 MG/ML
INJECTION, EMULSION INTRAVENOUS AS NEEDED
Status: DISCONTINUED | OUTPATIENT
Start: 2024-11-27 | End: 2024-11-27

## 2024-11-27 RX ORDER — LIDOCAINE HYDROCHLORIDE 20 MG/ML
INJECTION, SOLUTION INFILTRATION; PERINEURAL AS NEEDED
Status: DISCONTINUED | OUTPATIENT
Start: 2024-11-27 | End: 2024-11-27

## 2024-11-27 SDOH — HEALTH STABILITY: MENTAL HEALTH: CURRENT SMOKER: 0

## 2024-11-27 ASSESSMENT — COLUMBIA-SUICIDE SEVERITY RATING SCALE - C-SSRS
2. HAVE YOU ACTUALLY HAD ANY THOUGHTS OF KILLING YOURSELF?: NO
1. IN THE PAST MONTH, HAVE YOU WISHED YOU WERE DEAD OR WISHED YOU COULD GO TO SLEEP AND NOT WAKE UP?: NO
6. HAVE YOU EVER DONE ANYTHING, STARTED TO DO ANYTHING, OR PREPARED TO DO ANYTHING TO END YOUR LIFE?: NO

## 2024-11-27 ASSESSMENT — PAIN - FUNCTIONAL ASSESSMENT
PAIN_FUNCTIONAL_ASSESSMENT: 0-10

## 2024-11-27 ASSESSMENT — PAIN SCALES - GENERAL
PAINLEVEL_OUTOF10: 0 - NO PAIN
PAIN_LEVEL: 0
PAINLEVEL_OUTOF10: 0 - NO PAIN

## 2024-11-27 NOTE — ANESTHESIA PREPROCEDURE EVALUATION
Patient: Ann Marie Montanez    Procedure Information       Anesthesia Start Date/Time: 11/27/24 0958    Scheduled providers: Galileo Cid MD    Procedure: EGD    Location:  [facility]            Relevant Problems   Anesthesia (within normal limits)      Cardiac (within normal limits)      Pulmonary   (+) Asthma   (+) Mild intermittent asthma without complication ([facility]-HCC)      Neuro (within normal limits)      GI   (+) GERD (gastroesophageal reflux disease)      /Renal (within normal limits)      Liver (within normal limits)      Endocrine (within normal limits)      Hematology (within normal limits)      Musculoskeletal (within normal limits)      HEENT   (+) Sinus drainage      ID (within normal limits)      Skin   (+) Rash of both hands      GYN (within normal limits)       Clinical information reviewed:   Tobacco  Allergies  Meds   Med Hx  Surg Hx  OB Status  Fam Hx  Soc   Hx        NPO Detail:  NPO/Void Status  Carbohydrate Drink Given Prior to Surgery? : N  Date of Last Liquid: 11/26/24  Time of Last Liquid: 2230  Date of Last Solid: 11/26/24  Time of Last Solid: 2000  Last Intake Type: Food  Time of Last Void: 0830         Physical Exam    Airway  Mallampati: II  TM distance: >3 FB  Neck ROM: full     Cardiovascular - normal exam  Rhythm: regular  Rate: normal     Dental - normal exam     Pulmonary - normal exam     Abdominal - normal exam             Anesthesia Plan    History of general anesthesia?: yes  History of complications of general anesthesia?: no    ASA 2     MAC     The patient is not a current smoker.    Anesthetic plan and risks discussed with patient.

## 2024-11-27 NOTE — ADDENDUM NOTE
Encounter addended by: Zee Shoemaker RN on: 11/27/2024 11:15 AM   Actions taken: Check Out activity completed

## 2024-11-27 NOTE — ANESTHESIA POSTPROCEDURE EVALUATION
Patient: Ann Marie Montanez    Procedure Summary       Date: 11/27/24 Room / Location: [facility]    Anesthesia Start: 0958 Anesthesia Stop: 1015    Procedure: EGD Diagnosis: Gastroesophageal reflux disease, unspecified whether esophagitis present    Scheduled Providers: Galileo Cid MD Responsible Provider: TYLER Orozco    Anesthesia Type: MAC ASA Status: 2            Anesthesia Type: MAC    Vitals Value Taken Time   /70 11/27/24 1025   Temp 36.9 °C (98.5 °F) 11/27/24 1015   Pulse 67 11/27/24 1025   Resp 16 11/27/24 1025   SpO2 100 % 11/27/24 1025       Anesthesia Post Evaluation    Patient location during evaluation: bedside  Patient participation: complete - patient participated  Level of consciousness: awake and alert  Pain score: 0  Pain management: adequate  Airway patency: patent  Cardiovascular status: acceptable and hemodynamically stable  Respiratory status: acceptable  Hydration status: acceptable  Postoperative Nausea and Vomiting: none        There were no known notable events for this encounter.

## 2024-12-02 ENCOUNTER — APPOINTMENT (OUTPATIENT)
Dept: GASTROENTEROLOGY | Facility: HOSPITAL | Age: 44
End: 2024-12-02
Payer: COMMERCIAL

## 2024-12-20 ASSESSMENT — ENCOUNTER SYMPTOMS
COUGH: 0
ROS GI COMMENTS: SEE HPI
WOUND: 0
WEAKNESS: 0
CHILLS: 0
SHORTNESS OF BREATH: 0
JOINT SWELLING: 0
PALPITATIONS: 0
ADENOPATHY: 0
DIZZINESS: 0
CONFUSION: 0
FEVER: 0
DIFFICULTY URINATING: 0
SORE THROAT: 0
TROUBLE SWALLOWING: 0
ARTHRALGIAS: 0
BRUISES/BLEEDS EASILY: 0

## 2024-12-20 NOTE — PROGRESS NOTES
Subjective   Patient ID: Ann Marie Montanez is a 44 y.o. female with PMH of GERD and melanoma who presents for Follow-up (Follow up after EGD on 11/27/2024).     Patient's PCP is MIRA Damon-CNP     HPI  Patient last seen 10/31/24 for atypical reflux symptoms of hoarse voice and throat clearing. EGD 11/27/24 was normal.     She is here today for follow up. She was off her PPI for a while, but recently restarted due to developing a hoarse voice again. She plans to stay on the PPI for a while to get through the upcoming holidays, but then would like to wean off again if possible. She denies heartburn, N/V, melena, dysphagia, abdominal pain, change in bowel habits, or hematochezia.     Summary of endoscopies:  - BRAVO capsule 2/2015: Demeester score 14.72; done off PPI x7 days ([facility])  - Colonoscopy 9/2024: normal colon   - EGD 11/2024: normal esophagus, stomach, and duodenum     Social Hx:  Tobacco: none   Etoh: 1/week  Recreational drug use: none   NSAIDs: none      Family Hx:  Colon cancer -- great grandmother     Review of Systems:  Review of Systems   Constitutional:  Negative for chills and fever.   HENT:  Negative for sore throat and trouble swallowing.    Respiratory:  Negative for cough and shortness of breath.    Cardiovascular:  Negative for chest pain and palpitations.   Gastrointestinal:         SEE HPI   Endocrine: Negative for cold intolerance and heat intolerance.   Genitourinary:  Negative for difficulty urinating.   Musculoskeletal:  Negative for arthralgias and joint swelling.   Skin:  Negative for rash and wound.   Neurological:  Negative for dizziness and weakness.   Hematological:  Negative for adenopathy. Does not bruise/bleed easily.   Psychiatric/Behavioral:  Negative for confusion.         Medications:  Prior to Admission medications    Medication Sig Start Date End Date Taking? Authorizing Provider   albuterol (ProAir HFA) 90 mcg/actuation inhaler Inhale 2 puffs every 4 hours if  needed for wheezing or shortness of breath. 9/24/24 9/24/25  WILBERTO Corea   beclomethasone HFA (Qvar) 40 mcg/actuation inhaler Inhale 1 Inhalation 2 times a day. Rinse mouth with water after use to reduce aftertaste and incidence of candidiasis. Do not swallow. 9/24/24 1/22/25  WILBERTO Corea   cetirizine (ZyrTEC) 10 mg tablet Take 1 tablet (10 mg) by mouth.    Historical Provider, MD   famotidine (Pepcid) 20 mg tablet Take 1 tablet (20 mg) by mouth once daily at bedtime. 7/11/24 1/7/25  WILBERTO Corea   multivitamin tablet Take 1 tablet by mouth once daily.    Historical Provider, MD   omeprazole (PriLOSEC) 40 mg DR capsule Take 1 capsule (40 mg) by mouth early in the morning.. 9/3/24   Historical Provider, MD       Allergies:  Cefaclor and Pollen extracts    Past Medical History:  She has a past medical history of Asthma, GERD (gastroesophageal reflux disease), and Personal history of malignant melanoma of skin.    Past Surgical History:  She has a past surgical history that includes Other surgical history (09/16/2021) and Colonoscopy (09/23/2024).    Social History:  She reports that she has never smoked. She has never been exposed to tobacco smoke. She has never used smokeless tobacco. She reports current alcohol use of about 1.0 standard drink of alcohol per week. She reports that she does not use drugs.    Objective   Physical exam:  Physical Exam  Constitutional:       General: She is not in acute distress.     Appearance: Normal appearance.   HENT:      Mouth/Throat:      Mouth: Mucous membranes are moist.      Comments: pink  Eyes:      Conjunctiva/sclera: Conjunctivae normal.      Pupils: Pupils are equal, round, and reactive to light.   Cardiovascular:      Rate and Rhythm: Normal rate and regular rhythm.      Heart sounds: No murmur heard.  Pulmonary:      Effort: Pulmonary effort is normal.      Breath sounds: Normal breath sounds.    Abdominal:      General: Bowel sounds are normal. There is no distension.      Palpations: Abdomen is soft.      Tenderness: There is no abdominal tenderness. There is no guarding.   Skin:     General: Skin is warm and dry.      Coloration: Skin is not jaundiced.   Neurological:      Mental Status: She is alert and oriented to person, place, and time.   Psychiatric:         Mood and Affect: Mood normal.         Behavior: Behavior normal.          Assessment/Plan     GERD  Recent EGD showed no complications of GERD. Recently had to restart PPI due to return of atypical symptoms (hoarse voice). She will stay on this for a few weeks, and then wean off if able. If not, will plan to keep her on the lowest effective dose of medication           Zee Crowder, MIRA-CNP

## 2024-12-23 ENCOUNTER — APPOINTMENT (OUTPATIENT)
Facility: CLINIC | Age: 44
End: 2024-12-23
Payer: COMMERCIAL

## 2024-12-23 VITALS
SYSTOLIC BLOOD PRESSURE: 124 MMHG | HEART RATE: 103 BPM | DIASTOLIC BLOOD PRESSURE: 85 MMHG | BODY MASS INDEX: 29.06 KG/M2 | OXYGEN SATURATION: 99 % | HEIGHT: 63 IN | WEIGHT: 164 LBS

## 2024-12-23 DIAGNOSIS — K21.9 GASTROESOPHAGEAL REFLUX DISEASE, UNSPECIFIED WHETHER ESOPHAGITIS PRESENT: Primary | ICD-10-CM

## 2024-12-23 PROCEDURE — 1036F TOBACCO NON-USER: CPT | Performed by: NURSE PRACTITIONER

## 2024-12-23 PROCEDURE — 3008F BODY MASS INDEX DOCD: CPT | Performed by: NURSE PRACTITIONER

## 2024-12-23 PROCEDURE — 99214 OFFICE O/P EST MOD 30 MIN: CPT | Performed by: NURSE PRACTITIONER

## 2024-12-23 RX ORDER — OMEPRAZOLE 20 MG/1
20 CAPSULE, DELAYED RELEASE ORAL
Qty: 90 CAPSULE | Refills: 0 | Status: SHIPPED | OUTPATIENT
Start: 2024-12-23 | End: 2025-03-23

## 2024-12-23 NOTE — PATIENT INSTRUCTIONS
Thank you for coming to your appointment   - continue the omeprazole 40mg for the next couple weeks, then wean off. If you need to restart the medication due to return of symptoms, you can restart at the 20mg dose (I sent this today). Some people need to stay on this long term, and that is ok. This medication works well to block the acid that causes symptoms   - you can use the famotidine as needed for breakthrough reflux   - follow up at least once a year to maintain medication if needed     Please call [phone] with any questions or concerns       If you utilize [employer] messages, please understand that these are intended to be used for simple and straightforward medical questions. If you have a more complex question or numerous complaints, an office appointment may be needed.

## 2025-04-07 ENCOUNTER — PATIENT MESSAGE (OUTPATIENT)
Dept: PRIMARY CARE | Facility: CLINIC | Age: 45
End: 2025-04-07
Payer: COMMERCIAL

## 2025-04-07 DIAGNOSIS — R92.8 ABNORMAL SCREENING MAMMOGRAM: Primary | ICD-10-CM

## 2025-05-01 ENCOUNTER — HOSPITAL ENCOUNTER (OUTPATIENT)
Dept: RADIOLOGY | Facility: HOSPITAL | Age: 45
Discharge: HOME | End: 2025-05-01
Payer: COMMERCIAL

## 2025-05-01 DIAGNOSIS — R92.8 ABNORMAL SCREENING MAMMOGRAM: ICD-10-CM

## 2025-05-01 PROCEDURE — 77065 DX MAMMO INCL CAD UNI: CPT | Mod: LT

## 2025-06-26 ENCOUNTER — APPOINTMENT (OUTPATIENT)
Dept: PRIMARY CARE | Facility: CLINIC | Age: 45
End: 2025-06-26
Payer: COMMERCIAL

## 2025-07-30 ENCOUNTER — TELEPHONE (OUTPATIENT)
Dept: PRIMARY CARE | Facility: CLINIC | Age: 45
End: 2025-07-30
Payer: COMMERCIAL

## 2025-07-30 DIAGNOSIS — Z12.31 OTHER SCREENING MAMMOGRAM: Primary | ICD-10-CM

## 2025-07-30 NOTE — TELEPHONE ENCOUNTER
Patient called back because her appointments were canceled twice now.  Once with Polina, and once with Karina.  She is concerned because she needs a mammogram.  She is on the list for the new doctors because she doesn't want to wait again till January.  Is there anyway we can order the mammogram in the meantime.  Please call her and let her know.

## 2025-08-18 ENCOUNTER — APPOINTMENT (OUTPATIENT)
Dept: PRIMARY CARE | Facility: CLINIC | Age: 45
End: 2025-08-18
Payer: COMMERCIAL

## 2026-01-09 ENCOUNTER — APPOINTMENT (OUTPATIENT)
Dept: PRIMARY CARE | Facility: CLINIC | Age: 46
End: 2026-01-09
Payer: COMMERCIAL